# Patient Record
Sex: FEMALE | Race: WHITE | Employment: FULL TIME | URBAN - METROPOLITAN AREA
[De-identification: names, ages, dates, MRNs, and addresses within clinical notes are randomized per-mention and may not be internally consistent; named-entity substitution may affect disease eponyms.]

---

## 2017-02-26 ENCOUNTER — APPOINTMENT (OUTPATIENT)
Dept: GENERAL RADIOLOGY | Age: 27
End: 2017-02-26
Attending: EMERGENCY MEDICINE
Payer: COMMERCIAL

## 2017-02-26 ENCOUNTER — HOSPITAL ENCOUNTER (EMERGENCY)
Age: 27
Discharge: HOME | End: 2017-02-26
Attending: EMERGENCY MEDICINE
Payer: COMMERCIAL

## 2017-02-26 VITALS
WEIGHT: 155 LBS | RESPIRATION RATE: 16 BRPM | TEMPERATURE: 98.6 F | BODY MASS INDEX: 24.91 KG/M2 | OXYGEN SATURATION: 99 % | DIASTOLIC BLOOD PRESSURE: 70 MMHG | SYSTOLIC BLOOD PRESSURE: 108 MMHG | HEART RATE: 72 BPM | HEIGHT: 66 IN

## 2017-02-26 DIAGNOSIS — S16.1XXA CERVICAL STRAIN, INITIAL ENCOUNTER: ICD-10-CM

## 2017-02-26 DIAGNOSIS — S46.911A SHOULDER STRAIN, RIGHT, INITIAL ENCOUNTER: ICD-10-CM
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
## 2017-02-26 LAB — HCG UR QL: NEGATIVE

## 2017-02-26 PROCEDURE — 74011250637 HC RX REV CODE- 250/637: Performed by: PHYSICIAN ASSISTANT

## 2017-02-26 PROCEDURE — 99284 EMERGENCY DEPT VISIT MOD MDM: CPT | Performed by: PHYSICIAN ASSISTANT

## 2017-02-26 PROCEDURE — 72100 X-RAY EXAM L-S SPINE 2/3 VWS: CPT

## 2017-02-26 PROCEDURE — 81025 URINE PREGNANCY TEST: CPT

## 2017-02-26 PROCEDURE — 72040 X-RAY EXAM NECK SPINE 2-3 VW: CPT

## 2017-02-26 PROCEDURE — 73030 X-RAY EXAM OF SHOULDER: CPT

## 2017-02-26 RX ORDER — BUTALBITAL, ACETAMINOPHEN AND CAFFEINE 50; 325; 40 MG/1; MG/1; MG/1
2 TABLET ORAL
Status: COMPLETED | OUTPATIENT
Start: 2017-02-26 | End: 2017-02-26

## 2017-02-26 RX ORDER — KETOROLAC TROMETHAMINE 10 MG/1
10 TABLET, FILM COATED ORAL
Status: COMPLETED | OUTPATIENT
Start: 2017-02-26 | End: 2017-02-26

## 2017-02-26 RX ORDER — BUTALBITAL, ACETAMINOPHEN AND CAFFEINE 300; 40; 50 MG/1; MG/1; MG/1
1 CAPSULE ORAL
Qty: 40 CAP | Refills: 0 | Status: SHIPPED | OUTPATIENT
Start: 2017-02-26

## 2017-02-26 RX ADMIN — KETOROLAC TROMETHAMINE 10 MG: 10 TABLET, FILM COATED ORAL at 19:34

## 2017-02-26 RX ADMIN — BUTALBITAL, ACETAMINOPHEN AND CAFFEINE 2 TABLET: 50; 325; 40 TABLET ORAL at 19:33

## 2017-02-26 NOTE — LETTER
3777 [facility] EMERGENCY DEPT One [address] [address] 
[phone] Work/School Note Date: 2/26/2017 To Whom It May concern: 
 
Gypsy Wood was seen and treated today in the emergency room by the following provider(s): 
Attending Provider: Bea Medrano DO Physician Assistant: CELESTINE Brenner. Gypsy Wood may return to work on 03/01/17. Sincerely, CELESTINE Brenner

## 2017-02-26 NOTE — LETTER
3777 [facility] EMERGENCY DEPT One [address] [address] 
[phone] Work/School Note Date: 2/26/2017 To Whom It May concern: 
 
Kassandra Saint was seen and treated today in the emergency room by the following provider(s): 
Attending Provider: Kate Brothers DO Physician Assistant: CELESTINE Lau. Kassandra Saint may return to work on 02/28/17. Sincerely, CELESTINE Lau
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
## 2017-02-26 NOTE — Clinical Note
I will treat patient for lumbar, thoracic and cervical strain and have her use warm, moist heat to area, massage, gentle range of motion and stretching to area. Use the medication as directed, ED if worse. I will refer to a chiropractor for follow up if  needed.
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
## 2017-02-28 ENCOUNTER — HOSPITAL ENCOUNTER (EMERGENCY)
Age: 27
Discharge: HOME | End: 2017-02-28
Attending: EMERGENCY MEDICINE
Payer: COMMERCIAL

## 2017-02-28 ENCOUNTER — APPOINTMENT (OUTPATIENT)
Dept: GENERAL RADIOLOGY | Age: 27
End: 2017-02-28
Payer: COMMERCIAL

## 2017-02-28 VITALS
TEMPERATURE: 97.8 F | HEIGHT: 66 IN | DIASTOLIC BLOOD PRESSURE: 58 MMHG | HEART RATE: 85 BPM | BODY MASS INDEX: 25.39 KG/M2 | OXYGEN SATURATION: 98 % | SYSTOLIC BLOOD PRESSURE: 103 MMHG | RESPIRATION RATE: 16 BRPM | WEIGHT: 158 LBS
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
## 2017-02-28 DIAGNOSIS — T14.8XXA MUSCLE STRAIN: Primary | ICD-10-CM

## 2017-02-28 PROCEDURE — 99283 EMERGENCY DEPT VISIT LOW MDM: CPT | Performed by: PHYSICIAN ASSISTANT

## 2017-02-28 PROCEDURE — 72170 X-RAY EXAM OF PELVIS: CPT

## 2017-02-28 PROCEDURE — 74011250637 HC RX REV CODE- 250/637: Performed by: PHYSICIAN ASSISTANT

## 2017-02-28 RX ORDER — METHOCARBAMOL 750 MG/1
750 TABLET, FILM COATED ORAL 3 TIMES DAILY
Qty: 30 TAB | Refills: 0 | Status: SHIPPED | OUTPATIENT
Start: 2017-02-28 | End: 2017-03-10

## 2017-02-28 RX ORDER — METHOCARBAMOL 750 MG/1
750 TABLET, FILM COATED ORAL
Status: COMPLETED | OUTPATIENT
Start: 2017-02-28 | End: 2017-02-28

## 2017-02-28 RX ORDER — LEVOTHYROXINE SODIUM 50 UG/1
TABLET ORAL
COMMUNITY

## 2017-02-28 RX ADMIN — METHOCARBAMOL 750 MG: 750 TABLET ORAL at 12:14

## 2017-02-28 NOTE — LETTER
[facility] EMERGENCY DEPT 
[address] [place] [address] [place] [address] 
[phone] Work/School Note Date: 2/28/2017 To Whom It May concern: 
 
Ida Arreola was seen and treated today in the emergency room by the following provider(s): 
Attending Provider: Maryam Matos MD 
Physician Assistant: CELESTINE Ayon. Ida Arreola may return to work on 3-1-17. Sincerely, CELESTINE Ayon
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
## 2021-04-05 ENCOUNTER — HOSPITAL ENCOUNTER (OUTPATIENT)
Dept: LAB | Age: 31
Discharge: HOME | End: 2021-04-05

## 2021-04-05 PROCEDURE — 88305 TISSUE EXAM BY PATHOLOGIST: CPT

## 2021-04-05 PROCEDURE — 88312 SPECIAL STAINS GROUP 1: CPT
